# Patient Record
Sex: FEMALE | Race: WHITE | NOT HISPANIC OR LATINO | Employment: STUDENT | URBAN - METROPOLITAN AREA
[De-identification: names, ages, dates, MRNs, and addresses within clinical notes are randomized per-mention and may not be internally consistent; named-entity substitution may affect disease eponyms.]

---
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
## 2021-02-26 DIAGNOSIS — J45.31 MILD PERSISTENT ASTHMA WITH ACUTE EXACERBATION: ICD-10-CM

## 2021-02-26 DIAGNOSIS — J30.1 CHRONIC SEASONAL ALLERGIC RHINITIS DUE TO POLLEN: ICD-10-CM

## 2021-02-28 RX ORDER — MONTELUKAST SODIUM 10 MG/1
TABLET ORAL
Qty: 90 TABLET | Refills: 0 | Status: SHIPPED | OUTPATIENT
Start: 2021-02-28 | End: 2021-06-30

## 2021-06-28 ASSESSMENT — ENCOUNTER SYMPTOMS
DYSURIA: 0
NERVOUS/ANXIOUS: 1
CHILLS: 0
ABDOMINAL PAIN: 0
HEARTBURN: 0
WEAKNESS: 0
HEMATOCHEZIA: 0
NAUSEA: 0
CONSTIPATION: 0
HEMATURIA: 0
SORE THROAT: 0
BREAST MASS: 0
EYE PAIN: 0
PALPITATIONS: 0
MYALGIAS: 0
SHORTNESS OF BREATH: 0
DIZZINESS: 0
JOINT SWELLING: 0
ARTHRALGIAS: 0
FEVER: 0
COUGH: 0
DIARRHEA: 0
FREQUENCY: 0
PARESTHESIAS: 0
HEADACHES: 0

## 2021-06-28 NOTE — PROGRESS NOTES
"    Assessment & Plan     Mild persistent asthma without complication  Well controlled, continue current medications without change  - albuterol (VENTOLIN HFA) 108 (90 Base) MCG/ACT inhaler; INHALE TWO PUFFS BY MOUTH EVERY 4 HOURS AS NEEDED FOR SHORTNESS OF BREATH OR DYSPNEA    Chronic seasonal allergic rhinitis due to pollen  Well controlled, continue current medications  - montelukast (SINGULAIR) 10 MG tablet; Take 1 tablet (10 mg) by mouth At Bedtime    Migraine with aura and without status migrainosus, not intractable  Well controlled, continue current medications  - topiramate (TOPAMAX) 25 MG tablet; Take 1 tablet (25 mg) by mouth 2 times daily  - SUMAtriptan (IMITREX) 50 MG tablet; Take 1 tablet (50 mg) by mouth at onset of headache for migraine May repeat in 2 hours. Try to limit to 3-4 tablets per week.  - ondansetron (ZOFRAN) 4 MG tablet; Take 1 tablet (4 mg) by mouth every 8 hours as needed for nausea    Anxiety  Discussed options for treatment- could start with medications, therapy, or both. Patient opts for medications. If patient chooses to start medications, discussed it takes about 2 weeks for medications to be effective, patients with depression may have increased suicidal ideation at this time and should be alert to this. Reviewed the need for at least 6-12 months of medication once mood is stable, may then consider weaning down after discussing with provider. If patient chooses to start with therapy, recommend at least 3 visits minimum with a therapist, may need to see more than one therapist to find a good fit. Also discussed healthy lifestyle habits including exercise, diet, and regular sleep schedule.    - escitalopram (LEXAPRO) 5 MG tablet; Take 1 tablet (5 mg) by mouth daily    Ordering of each unique test  Prescription drug management         BMI:   Estimated body mass index is 40.32 kg/m  as calculated from the following:    Height as of 7/22/20: 1.765 m (5' 9.5\").    Weight as of 7/22/20: " 125.6 kg (277 lb).       See Patient Instructions    Return in about 1 month (around 7/30/2021) for Anxiety-virtual visit ok.    ANTONIETA Fierro CNP [facility] YONNY Marlow is a 21 year old who presents for the following health issues     Asthma Follow-Up    Was ACT completed today?    Yes    ACT Total Scores 6/30/2020   ACT TOTAL SCORE -   ASTHMA ER VISITS -   ASTHMA HOSPITALIZATIONS -   ACT TOTAL SCORE (Goal Greater than or Equal to 20) 21   In the past 12 months, how many times did you visit the emergency room for your asthma without being admitted to the hospital? 0   In the past 12 months, how many times were you hospitalized overnight because of your asthma? 0          How many days per week do you miss taking your asthma controller medication?  0    Please describe any recent triggers for your asthma: COVID vaccine    Have you had any Emergency Room Visits, Urgent Care Visits, or Hospital Admissions since your last office visit?  No    Migraines are well controlled- occasional breakthrough headache if forgetting the Topamax. Hasn't used Imitrex for some time.    Anxiety the last couple years, panic attack once during basketball. Was vaping to cope, but quit this when she moved home for the summer. No alcohol or other drugs. No depression symptoms. Wonders about medications        Review of Systems   Constitutional: Negative for chills and fever.   HENT: Negative for congestion, ear pain, hearing loss and sore throat.    Eyes: Negative for pain and visual disturbance.   Respiratory: Negative for cough and shortness of breath.    Cardiovascular: Negative for chest pain, palpitations and peripheral edema.   Gastrointestinal: Negative for abdominal pain, constipation, diarrhea, heartburn, hematochezia and nausea.   Breasts:  Negative for tenderness, breast mass and discharge.   Genitourinary: Negative for dysuria, frequency, genital sores, hematuria, pelvic pain,  urgency, vaginal bleeding and vaginal discharge.   Musculoskeletal: Negative for arthralgias, joint swelling and myalgias.   Skin: Negative for rash.   Neurological: Negative for dizziness, weakness, headaches and paresthesias.   Psychiatric/Behavioral: Negative for mood changes. The patient is nervous/anxious.             Objective    There were no vitals taken for this visit.  There is no height or weight on file to calculate BMI.  Physical Exam   GENERAL: healthy, alert and no distress  RESP: lungs clear to auscultation - no rales, rhonchi or wheezes  CV: regular rate and rhythm, normal S1 S2, no S3 or S4, no murmur, click or rub, no peripheral edema and peripheral pulses strong  NEURO: Normal strength and tone, mentation intact and speech normal  PSYCH: mentation appears normal, affect normal/bright    No results found for any visits on 06/30/21.

## 2021-06-30 ENCOUNTER — OFFICE VISIT (OUTPATIENT)
Dept: FAMILY MEDICINE | Facility: CLINIC | Age: 21
End: 2021-06-30
Payer: COMMERCIAL

## 2021-06-30 DIAGNOSIS — J30.1 CHRONIC SEASONAL ALLERGIC RHINITIS DUE TO POLLEN: ICD-10-CM

## 2021-06-30 DIAGNOSIS — G43.109 MIGRAINE WITH AURA AND WITHOUT STATUS MIGRAINOSUS, NOT INTRACTABLE: ICD-10-CM

## 2021-06-30 DIAGNOSIS — F41.9 ANXIETY: ICD-10-CM

## 2021-06-30 DIAGNOSIS — J45.30 MILD PERSISTENT ASTHMA WITHOUT COMPLICATION: Primary | ICD-10-CM

## 2021-06-30 PROCEDURE — 99214 OFFICE O/P EST MOD 30 MIN: CPT | Performed by: NURSE PRACTITIONER

## 2021-06-30 RX ORDER — SUMATRIPTAN 50 MG/1
50 TABLET, FILM COATED ORAL
Qty: 18 TABLET | Refills: 3 | Status: SHIPPED | OUTPATIENT
Start: 2021-06-30

## 2021-06-30 RX ORDER — MONTELUKAST SODIUM 10 MG/1
1 TABLET ORAL AT BEDTIME
Qty: 90 TABLET | Refills: 3 | Status: SHIPPED | OUTPATIENT
Start: 2021-06-30 | End: 2024-01-15

## 2021-06-30 RX ORDER — ALBUTEROL SULFATE 0.83 MG/ML
2.5 SOLUTION RESPIRATORY (INHALATION) EVERY 6 HOURS PRN
Qty: 60 ML | Refills: 1 | Status: SHIPPED | OUTPATIENT
Start: 2021-06-30

## 2021-06-30 RX ORDER — ESCITALOPRAM OXALATE 5 MG/1
5 TABLET ORAL DAILY
Qty: 30 TABLET | Refills: 1 | Status: SHIPPED | OUTPATIENT
Start: 2021-06-30 | End: 2024-01-15

## 2021-06-30 RX ORDER — ALBUTEROL SULFATE 90 UG/1
AEROSOL, METERED RESPIRATORY (INHALATION)
Qty: 18 G | Refills: 3 | Status: SHIPPED | OUTPATIENT
Start: 2021-06-30 | End: 2024-01-15

## 2021-06-30 RX ORDER — FLUTICASONE PROPIONATE 44 UG/1
2 AEROSOL, METERED RESPIRATORY (INHALATION) 2 TIMES DAILY
Qty: 10.6 G | Refills: 5 | Status: SHIPPED | OUTPATIENT
Start: 2021-06-30 | End: 2024-01-15

## 2021-06-30 RX ORDER — ONDANSETRON 4 MG/1
4 TABLET, FILM COATED ORAL EVERY 8 HOURS PRN
Qty: 20 TABLET | Refills: 0 | Status: SHIPPED | OUTPATIENT
Start: 2021-06-30

## 2021-06-30 RX ORDER — TOPIRAMATE 25 MG/1
25 TABLET, FILM COATED ORAL 2 TIMES DAILY
Qty: 180 TABLET | Refills: 3 | Status: SHIPPED | OUTPATIENT
Start: 2021-06-30 | End: 2024-01-15

## 2021-06-30 NOTE — LETTER
My Asthma Action Plan    Name: Kashmir Vazquez   YOB: 2000  Date: 7/1/2021   My doctor: ANTONIETA Fierro CNP   My clinic: [facility]        My Control Medicine: Fluticasone propionate (Flovent HFA) - 44 mcg 2 puffs twice daily  Montelukast (Singulair) -  10 mg daily  My Rescue Medicine: Albuterol (Proair/Ventolin/Proventil HFA) 2-4 puffs EVERY 4 HOURS as needed. Use a spacer if recommended by your provider.   My Asthma Severity:   Mild Persistent  Know your asthma triggers:   COVID vaccine            GREEN ZONE   Good Control    I feel good    No cough or wheeze    Can work, sleep and play without asthma symptoms       Take your asthma control medicine every day.     1. If exercise triggers your asthma, take your rescue medication    15 minutes before exercise or sports, and    During exercise if you have asthma symptoms  2. Spacer to use with inhaler: If you have a spacer, make sure to use it with your inhaler             YELLOW ZONE Getting Worse  I have ANY of these:    I do not feel good    Cough or wheeze    Chest feels tight    Wake up at night   1. Keep taking your Green Zone medications  2. Start taking your rescue medicine:    every 20 minutes for up to 1 hour. Then every 4 hours for 24-48 hours.  3. If you stay in the Yellow Zone for more than 12-24 hours, contact your doctor.  4. If you do not return to the Green Zone in 12-24 hours or you get worse, start taking your oral steroid medicine if prescribed by your provider.           RED ZONE Medical Alert - Get Help  I have ANY of these:    I feel awful    Medicine is not helping    Breathing getting harder    Trouble walking or talking    Nose opens wide to breathe       1. Take your rescue medicine NOW  2. If your provider has prescribed an oral steroid medicine, start taking it NOW  3. Call your doctor NOW  4. If you are still in the Red Zone after 20 minutes and you have not reached your doctor:    Take  your rescue medicine again and    Call 911 or go to the emergency room right away    See your regular doctor within 2 weeks of an Emergency Room or Urgent Care visit for follow-up treatment.          Annual Reminders:  Meet with Asthma Educator,  Flu Shot in the Fall, consider Pneumonia Vaccination for patients with asthma (aged 19 and older).    Pharmacy:    [facility]  [facility] - [place], [place] - [address]  [facility] - [place], [place] - [address] [place] [address]    Electronically signed by ANTONIETA Fierro CNP   Date: 07/01/21                      Asthma Triggers  How To Control Things That Make Your Asthma Worse    Triggers are things that make your asthma worse.  Look at the list below to help you find your triggers and what you can do about them.  You can help prevent asthma flare-ups by staying away from your triggers.      Trigger                                                          What you can do   Cigarette Smoke  Tobacco smoke can make asthma worse. Do not allow smoking in your home, car or around you.  Be sure no one smokes at a child s day care or school.  If you smoke, ask your health care provider for ways to help you quit.  Ask family members to quit too.  Ask your health care provider for a referral to Quit Plan to help you quit smoking, or call 6-750-168-PLAN.     Colds, Flu, Bronchitis  These are common triggers of asthma. Wash your hands often.  Don t touch your eyes, nose or mouth.  Get a flu shot every year.     Dust Mites  These are tiny bugs that live in cloth or carpet. They are too small to see. Wash sheets and blankets in hot water every week.   Encase pillows and mattress in dust mite proof covers.  Avoid having carpet if you can. If you have carpet, vacuum weekly.   Use a dust mask and HEPA vacuum.   Pollen and Outdoor Mold  Some people are allergic to trees, grass,  or weed pollen, or molds. Try to keep your windows closed.  Limit time out doors when pollen count is high.   Ask you health care provider about taking medicine during allergy season.     Animal Dander  Some people are allergic to skin flakes, urine or saliva from pets with fur or feathers. Keep pets with fur or feathers out of your home.    If you can t keep the pet outdoors, then keep the pet out of your bedroom.  Keep the bedroom door closed.  Keep pets off cloth furniture and away from stuffed toys.     Mice, Rats, and Cockroaches   Some people are allergic to the waste from these pests.   Cover food and garbage.  Clean up spills and food crumbs.  Store grease in the refrigerator.   Keep food out of the bedroom.   Indoor Mold  This can be a trigger if your home has high moisture. Fix leaking faucets, pipes, or other sources of water.   Clean moldy surfaces.  Dehumidify basement if it is damp and smelly.   Smoke, Strong Odors, and Sprays  These can reduce air quality. Stay away from strong odors and sprays, such as perfume, powder, hair spray, paints, smoke incense, paint, cleaning products, candles and new carpet.   Exercise or Sports  Some people with asthma have this trigger. Be active!  Ask your doctor about taking medicine before sports or exercise to prevent symptoms.    Warm up for 5-10 minutes before and after sports or exercise.     Other Triggers of Asthma  Cold air:  Cover your nose and mouth with a scarf.  Sometimes laughing or crying can be a trigger.  Some medicines and food can trigger asthma.

## 2021-07-01 ASSESSMENT — ASTHMA QUESTIONNAIRES: ACT_TOTALSCORE: 23

## 2021-10-02 ENCOUNTER — HEALTH MAINTENANCE LETTER (OUTPATIENT)
Age: 21
End: 2021-10-02

## 2022-02-14 ENCOUNTER — OFFICE VISIT (OUTPATIENT)
Dept: OBGYN | Facility: CLINIC | Age: 22
End: 2022-02-14
Payer: COMMERCIAL

## 2022-02-14 VITALS
DIASTOLIC BLOOD PRESSURE: 84 MMHG | BODY MASS INDEX: 34.76 KG/M2 | HEART RATE: 71 BPM | SYSTOLIC BLOOD PRESSURE: 134 MMHG | WEIGHT: 238.8 LBS

## 2022-02-14 DIAGNOSIS — Z23 NEED FOR PROPHYLACTIC VACCINATION AND INOCULATION AGAINST INFLUENZA: ICD-10-CM

## 2022-02-14 DIAGNOSIS — N93.9 ABNORMAL UTERINE BLEEDING (AUB): Primary | ICD-10-CM

## 2022-02-14 PROCEDURE — 90471 IMMUNIZATION ADMIN: CPT | Performed by: OBSTETRICS & GYNECOLOGY

## 2022-02-14 PROCEDURE — 99213 OFFICE O/P EST LOW 20 MIN: CPT | Mod: 25 | Performed by: OBSTETRICS & GYNECOLOGY

## 2022-02-14 PROCEDURE — 11983 REMOVE/INSERT DRUG IMPLANT: CPT | Performed by: OBSTETRICS & GYNECOLOGY

## 2022-02-14 PROCEDURE — 90686 IIV4 VACC NO PRSV 0.5 ML IM: CPT | Performed by: OBSTETRICS & GYNECOLOGY

## 2022-02-14 RX ORDER — NORGESTIMATE AND ETHINYL ESTRADIOL 0.25-0.035
1 KIT ORAL DAILY
Qty: 84 TABLET | Refills: 0 | Status: SHIPPED | OUTPATIENT
Start: 2022-02-14 | End: 2024-01-15

## 2022-02-14 NOTE — PROGRESS NOTES
GYN Progress Note     CC: Abnormal uterine bleeding     HISTORY OF PRESENT ILLNESS:  Kashmir Vazquez is a 22 year old  who presents for follow up for abnormal uterine bleeding. She was previously seen in clinic for dysmenorrhea and was started on OCPs for menstrual suppression, she did have some concerns about changes in mood as well as worsening headaches with OCPs so a Nexplanon implant was placed on  and has worked very well to suppress her periods until the last few months. She would typically only get an occasional spotting lasting 1-2 days but over the past few months this has become a lot more frequent and she is wondering if replacing the Nexplanon will improve her bleeding profile.     She is not currently sexually active and has not been in the past, not concerned about need for contraception at this point in time. She has never had a pap smear or pelvic exam. We discussed the role of pap smears in cervical cancer screening and that almost all cervical cancers are caused by HPV which is a sexually transmitted virus but can also be transmitted by genital contact (not just penetrative intercourse) as well as the ASCCP recommendations to start pap smear screening at age 21, she declines at this time.        OB HISTORY:  OB History    Para Term  AB Living   0 0 0 0 0 0   SAB IAB Ectopic Multiple Live Births   0 0 0 0 0            GYN HISTORY:  She has never had a pap smear or pelvic exam  HPV vaccine series completed      PAST MEDICAL HISTORY:  Past Medical History:   Diagnosis Date     Asthma             PAST SURGICAL HISTORY:  Past Surgical History:   Procedure Laterality Date     NO HISTORY OF SURGERY       ORTHOPEDIC SURGERY            CURRENT MEDICATIONS:   Current Outpatient Medications   Medication Sig Dispense Refill     etonogestrel (NEXPLANON) 68 MG IMPL 1 each (68 mg) by Subdermal route once       norgestimate-ethinyl estradiol (ORTHO-CYCLEN) 0.25-35 MG-MCG tablet Take 1  tablet by mouth daily 84 tablet 0     albuterol (PROVENTIL) (2.5 MG/3ML) 0.083% neb solution Take 1 vial (2.5 mg) by nebulization every 6 hours as needed for shortness of breath / dyspnea or wheezing 60 mL 1     albuterol (VENTOLIN HFA) 108 (90 Base) MCG/ACT inhaler INHALE TWO PUFFS BY MOUTH EVERY 4 HOURS AS NEEDED FOR SHORTNESS OF BREATH OR DYSPNEA 18 g 3     escitalopram (LEXAPRO) 5 MG tablet Take 1 tablet (5 mg) by mouth daily 30 tablet 1     fexofenadine (ALLEGRA) 180 MG tablet Take 180 mg by mouth daily       fluticasone (FLOVENT HFA) 44 MCG/ACT inhaler Inhale 2 puffs into the lungs 2 times daily 10.6 g 5     montelukast (SINGULAIR) 10 MG tablet Take 1 tablet (10 mg) by mouth At Bedtime 90 tablet 3     ondansetron (ZOFRAN) 4 MG tablet Take 1 tablet (4 mg) by mouth every 8 hours as needed for nausea 20 tablet 0     SUMAtriptan (IMITREX) 50 MG tablet Take 1 tablet (50 mg) by mouth at onset of headache for migraine May repeat in 2 hours. Try to limit to 3-4 tablets per week. 18 tablet 3     topiramate (TOPAMAX) 25 MG tablet Take 1 tablet (25 mg) by mouth 2 times daily 180 tablet 3            ALLERGIES:  Other [no clinical screening - see comments]         SOCIAL HISTORY:  Social History     Tobacco Use     Smoking status: Never Smoker     Smokeless tobacco: Never Used   Substance Use Topics     Alcohol use: No     Drug use: No            FAMILY HISTORY:  Family History   Problem Relation Age of Onset     Migraines Mother      Asthma Mother      Diabetes Maternal Grandmother      Colon Cancer Maternal Grandmother      Family History Negative No family hx of             REVIEW OF SYSTEMS:  See HPI        PHYSICAL EXAMINATION:  VS:/84   Pulse 71   Wt 108.3 kg (238 lb 12.8 oz)   BMI 34.76 kg/m    Body mass index is 34.76 kg/m .    General: Patient alert and oriented, no acute distress  CV: no peripheral edema or cyanosis  Resp: normal respiratory effort and equal lung expansion  : exam deferred   Ext:  non-tender, no edema    Nexplanon removal/replacement procedure     Kashmir Vazquez desired a nexplanon removal and reinsertion. We discussed the risks/benefits of the procedure, including small scar on the arm, mild pain/bruising after the procedure, irregular bleeding/spotting, and that the device is effective FDA approved for 3 years. Consent was signed and a time out performed. The device was located and the overlying skin was prepped with Betadine.  2 cc of 1% Lidocaine was injected at the site of incision and a scalpel was used to open the skin.  The Etonorgestrel device was located and brought to the opening made in the skin.  It was then grasped with a hemostat and the capsule was removed until the device could be easily removed.  Due to the change in the recommended location of device insertion since her prior Nexplanon was placed, we were unable to use the same incision to place the new device so skin glue was placed over the incision.      We then cleaned the skin of the left upper arm 3 finger widths from the olecranon process with betadine just below the groove between the biceps and triceps muscle. We injected 3 mL of 1% lidocaine into the arm and then inserted the nexplanon device subdermally.  Both the examiner and the patient felt the cassidy after placement. The site was dressed with a sterile dressing. The patient tolerated the procedure well. EBL minimal.    Laboratory values:  Imaging findings:        ASSESSMENT:  Kashmir Vazquez is a 22 year old  who presents for evaluation of abnormal uterine bleeding     PLAN:  -Dysmenorrhea has been well controlled for the past 3 years with the Nexplanon but over the past few months, Kashmir has started to get more irregular spotting. Her prior Nexplanon was removed and replaced with a new device without difficulty.   -She also requested a prescription for OCPs as she is heading back to college this week and is concerned about dealing with the  inconvenience of dealing with irregular bleeding at an inopportune time, we discussed that usually the irregular bleeding with the Nexplanon improves within 3-6 months but that she could do a short course of OCPs if needed to help manage the irregular bleeding if necessary.   -She declined a pelvic exam today but we would recommend that she have an exam done if the irregular bleeding doesn't resolve to rule out other etiologies. She was also encouraged to have pap smear screening completed as soon as she feels comfortable proceeding.     Dispo: RTC as needed   Elsy Gonzalez MD

## 2022-03-28 NOTE — PROGRESS NOTES
VISION   Wears glasses and contact lenses: worn for testing  Tool used: [place]   Right eye:        10/8 (20/16)  Left eye:          10/8 (20/16)  Visual Acuity: Pass    Tanna Henry MA       No

## 2022-09-03 ENCOUNTER — HEALTH MAINTENANCE LETTER (OUTPATIENT)
Age: 22
End: 2022-09-03

## 2023-01-15 ENCOUNTER — HEALTH MAINTENANCE LETTER (OUTPATIENT)
Age: 23
End: 2023-01-15

## 2023-10-29 ENCOUNTER — TRANSFERRED RECORDS (OUTPATIENT)
Dept: HEALTH INFORMATION MANAGEMENT | Facility: CLINIC | Age: 23
End: 2023-10-29
Payer: COMMERCIAL

## 2023-11-09 ENCOUNTER — TRANSFERRED RECORDS (OUTPATIENT)
Dept: HEALTH INFORMATION MANAGEMENT | Facility: CLINIC | Age: 23
End: 2023-11-09
Payer: COMMERCIAL

## 2024-01-14 ASSESSMENT — ASTHMA QUESTIONNAIRES
QUESTION_1 LAST FOUR WEEKS HOW MUCH OF THE TIME DID YOUR ASTHMA KEEP YOU FROM GETTING AS MUCH DONE AT WORK, SCHOOL OR AT HOME: NONE OF THE TIME
QUESTION_4 LAST FOUR WEEKS HOW OFTEN HAVE YOU USED YOUR RESCUE INHALER OR NEBULIZER MEDICATION (SUCH AS ALBUTEROL): ONCE A WEEK OR LESS
QUESTION_2 LAST FOUR WEEKS HOW OFTEN HAVE YOU HAD SHORTNESS OF BREATH: ONCE OR TWICE A WEEK
ACT_TOTALSCORE: 22
ACT_TOTALSCORE: 22
QUESTION_5 LAST FOUR WEEKS HOW WOULD YOU RATE YOUR ASTHMA CONTROL: WELL CONTROLLED
QUESTION_3 LAST FOUR WEEKS HOW OFTEN DID YOUR ASTHMA SYMPTOMS (WHEEZING, COUGHING, SHORTNESS OF BREATH, CHEST TIGHTNESS OR PAIN) WAKE YOU UP AT NIGHT OR EARLIER THAN USUAL IN THE MORNING: NOT AT ALL

## 2024-01-15 ENCOUNTER — OFFICE VISIT (OUTPATIENT)
Dept: FAMILY MEDICINE | Facility: CLINIC | Age: 24
End: 2024-01-15
Payer: COMMERCIAL

## 2024-01-15 VITALS
BODY MASS INDEX: 35.81 KG/M2 | HEIGHT: 72 IN | OXYGEN SATURATION: 97 % | HEART RATE: 81 BPM | SYSTOLIC BLOOD PRESSURE: 123 MMHG | DIASTOLIC BLOOD PRESSURE: 86 MMHG | WEIGHT: 264.4 LBS | TEMPERATURE: 98.3 F

## 2024-01-15 DIAGNOSIS — F41.0 PANIC ATTACK: ICD-10-CM

## 2024-01-15 DIAGNOSIS — G43.109 MIGRAINE WITH AURA AND WITHOUT STATUS MIGRAINOSUS, NOT INTRACTABLE: ICD-10-CM

## 2024-01-15 DIAGNOSIS — Z11.59 NEED FOR HEPATITIS C SCREENING TEST: ICD-10-CM

## 2024-01-15 DIAGNOSIS — Z12.4 CERVICAL CANCER SCREENING: ICD-10-CM

## 2024-01-15 DIAGNOSIS — J45.30 MILD PERSISTENT ASTHMA WITHOUT COMPLICATION: ICD-10-CM

## 2024-01-15 DIAGNOSIS — Z11.4 SCREENING FOR HIV (HUMAN IMMUNODEFICIENCY VIRUS): ICD-10-CM

## 2024-01-15 DIAGNOSIS — Z00.00 ROUTINE GENERAL MEDICAL EXAMINATION AT A HEALTH CARE FACILITY: Primary | ICD-10-CM

## 2024-01-15 DIAGNOSIS — R55 VASOVAGAL SYNCOPE: ICD-10-CM

## 2024-01-15 DIAGNOSIS — J30.1 CHRONIC SEASONAL ALLERGIC RHINITIS DUE TO POLLEN: ICD-10-CM

## 2024-01-15 DIAGNOSIS — Z97.5 NEXPLANON IN PLACE: ICD-10-CM

## 2024-01-15 LAB
ALBUMIN SERPL BCG-MCNC: 4.6 G/DL (ref 3.5–5.2)
ALP SERPL-CCNC: 74 U/L (ref 40–150)
ALT SERPL W P-5'-P-CCNC: 9 U/L (ref 0–50)
ANION GAP SERPL CALCULATED.3IONS-SCNC: 11 MMOL/L (ref 7–15)
AST SERPL W P-5'-P-CCNC: 24 U/L (ref 0–45)
BILIRUB SERPL-MCNC: 0.5 MG/DL
BUN SERPL-MCNC: 8.3 MG/DL (ref 6–20)
CALCIUM SERPL-MCNC: 9.6 MG/DL (ref 8.6–10)
CHLORIDE SERPL-SCNC: 106 MMOL/L (ref 98–107)
CREAT SERPL-MCNC: 0.81 MG/DL (ref 0.51–0.95)
DEPRECATED HCO3 PLAS-SCNC: 22 MMOL/L (ref 22–29)
EGFRCR SERPLBLD CKD-EPI 2021: >90 ML/MIN/1.73M2
ERYTHROCYTE [DISTWIDTH] IN BLOOD BY AUTOMATED COUNT: 12.3 % (ref 10–15)
GLUCOSE SERPL-MCNC: 86 MG/DL (ref 70–99)
HCT VFR BLD AUTO: 39.8 % (ref 35–47)
HCV AB SERPL QL IA: NONREACTIVE
HGB BLD-MCNC: 13.3 G/DL (ref 11.7–15.7)
HIV 1+2 AB+HIV1 P24 AG SERPL QL IA: NONREACTIVE
MCH RBC QN AUTO: 31.5 PG (ref 26.5–33)
MCHC RBC AUTO-ENTMCNC: 33.4 G/DL (ref 31.5–36.5)
MCV RBC AUTO: 94 FL (ref 78–100)
PLATELET # BLD AUTO: 285 10E3/UL (ref 150–450)
POTASSIUM SERPL-SCNC: 4 MMOL/L (ref 3.4–5.3)
PROT SERPL-MCNC: 7.7 G/DL (ref 6.4–8.3)
RBC # BLD AUTO: 4.22 10E6/UL (ref 3.8–5.2)
SODIUM SERPL-SCNC: 139 MMOL/L (ref 135–145)
T4 FREE SERPL-MCNC: 1.22 NG/DL (ref 0.9–1.7)
TSH SERPL DL<=0.005 MIU/L-ACNC: 2.5 UIU/ML (ref 0.3–4.2)
WBC # BLD AUTO: 5.7 10E3/UL (ref 4–11)

## 2024-01-15 PROCEDURE — 90686 IIV4 VACC NO PRSV 0.5 ML IM: CPT | Performed by: PHYSICIAN ASSISTANT

## 2024-01-15 PROCEDURE — 93000 ELECTROCARDIOGRAM COMPLETE: CPT | Performed by: PHYSICIAN ASSISTANT

## 2024-01-15 PROCEDURE — 90471 IMMUNIZATION ADMIN: CPT | Performed by: PHYSICIAN ASSISTANT

## 2024-01-15 PROCEDURE — 80053 COMPREHEN METABOLIC PANEL: CPT | Performed by: PHYSICIAN ASSISTANT

## 2024-01-15 PROCEDURE — 36415 COLL VENOUS BLD VENIPUNCTURE: CPT | Performed by: PHYSICIAN ASSISTANT

## 2024-01-15 PROCEDURE — 85027 COMPLETE CBC AUTOMATED: CPT | Performed by: PHYSICIAN ASSISTANT

## 2024-01-15 PROCEDURE — 87389 HIV-1 AG W/HIV-1&-2 AB AG IA: CPT | Performed by: PHYSICIAN ASSISTANT

## 2024-01-15 PROCEDURE — 99214 OFFICE O/P EST MOD 30 MIN: CPT | Mod: 25 | Performed by: PHYSICIAN ASSISTANT

## 2024-01-15 PROCEDURE — 86803 HEPATITIS C AB TEST: CPT | Performed by: PHYSICIAN ASSISTANT

## 2024-01-15 PROCEDURE — 82306 VITAMIN D 25 HYDROXY: CPT | Performed by: PHYSICIAN ASSISTANT

## 2024-01-15 PROCEDURE — 84439 ASSAY OF FREE THYROXINE: CPT | Performed by: PHYSICIAN ASSISTANT

## 2024-01-15 PROCEDURE — 84443 ASSAY THYROID STIM HORMONE: CPT | Performed by: PHYSICIAN ASSISTANT

## 2024-01-15 RX ORDER — MONTELUKAST SODIUM 10 MG/1
1 TABLET ORAL AT BEDTIME
Qty: 90 TABLET | Refills: 3 | Status: SHIPPED | OUTPATIENT
Start: 2024-01-15

## 2024-01-15 RX ORDER — HYDROXYZINE HYDROCHLORIDE 25 MG/1
25 TABLET, FILM COATED ORAL 3 TIMES DAILY PRN
Qty: 90 TABLET | Refills: 0 | Status: SHIPPED | OUTPATIENT
Start: 2024-01-15

## 2024-01-15 RX ORDER — ALBUTEROL SULFATE 90 UG/1
AEROSOL, METERED RESPIRATORY (INHALATION)
Qty: 18 G | Refills: 3 | Status: SHIPPED | OUTPATIENT
Start: 2024-01-15

## 2024-01-15 RX ORDER — TOPIRAMATE 25 MG/1
25 TABLET, FILM COATED ORAL 2 TIMES DAILY
Qty: 180 TABLET | Refills: 3 | Status: SHIPPED | OUTPATIENT
Start: 2024-01-15

## 2024-01-15 ASSESSMENT — ENCOUNTER SYMPTOMS: SYNCOPE: 1

## 2024-01-15 NOTE — PROGRESS NOTES
SUBJECTIVE:   Kashmir is a 23 year old, presenting for the following:  Syncope        1/15/2024     7:52 AM   Additional Questions   Roomed by An V.         1/15/2024     7:52 AM   Patient Reported Additional Medications   Patient reports taking the following new medications none       Syncope     History of Present Illness       Reason for visit:  Fainting/passing out  Symptom onset:  3-4 weeks ago  Symptoms include:  Light headed cold/clammy, pass out  Symptom intensity:  Moderate  Symptom progression:  Staying the same  Had these symptoms before:  No  What makes it better:  Drinking electrolytes with salt    She eats 2-3 servings of fruits and vegetables daily.She consumes 2 sweetened beverage(s) daily.She exercises with enough effort to increase her heart rate 10 to 19 minutes per day.  She exercises with enough effort to increase her heart rate 3 or less days per week.   She is taking medications regularly.      Today's PHQ-2 Score:       1/14/2024     8:59 AM   PHQ-2 ( 1999 [employer])   Q1: Little interest or pleasure in doing things 0   Q2: Feeling down, depressed or hopeless 0   PHQ-2 Score 0   Q1: Little interest or pleasure in doing things Not at all   Q2: Feeling down, depressed or hopeless Not at all   PHQ-2 Score 0       June 2023 - was at a concert, it was hot. She donated plasma earlier that morning. Not drinking much water.    Dec 27 2023 - was at a nail salon, got hot/lightheaded/ringing in ears. Eyes went black. Woke up on ground. Felt fine the rest of the day. Felt fine otherwise, no excessive alcohol.     Mom says she is always tired. Has had occasional nausea.     No family history of heart issues.     Graduated last year at [employer]. Working at [employer].    Migraines - was taking this daily, would like to get back on it. Still getting migraines, tries to sleep them off.             Have you ever done Advance Care Planning? (For example, a Health Directive, POLST, or a discussion with a medical  "provider or your loved ones about your wishes): No, advance care planning information given to patient to review.  Patient plans to discuss their wishes with loved ones or provider.      Social History     Tobacco Use    Smoking status: Never    Smokeless tobacco: Never   Substance Use Topics    Alcohol use: No             6/24/2021     7:35 AM   Alcohol Use   Prescreen: >3 drinks/day or >7 drinks/week? Not Applicable     Reviewed orders with patient.  Reviewed health maintenance and updated orders accordingly - Yes      Breast Cancer Screening:        History of abnormal Pap smear: NO - age 21-29 PAP every 3 years recommended     Reviewed and updated as needed this visit by clinical staff   Tobacco  Allergies  Meds              Reviewed and updated as needed this visit by Provider                     Review of Systems   Cardiovascular:  Positive for syncope.     CONSTITUTIONAL: NEGATIVE for fever, chills, change in weight  INTEGUMENTARU/SKIN: NEGATIVE for worrisome rashes, moles or lesions  EYES: NEGATIVE for vision changes or irritation  ENT: NEGATIVE for ear, mouth and throat problems  RESP: NEGATIVE for significant cough or SOB  BREAST: NEGATIVE for masses, tenderness or discharge  CV: NEGATIVE for chest pain, palpitations or peripheral edema  GI: NEGATIVE for nausea, abdominal pain, heartburn, or change in bowel habits  : NEGATIVE for unusual urinary or vaginal symptoms. Periods are regular.  MUSCULOSKELETAL: NEGATIVE for significant arthralgias or myalgia  NEURO: NEGATIVE for weakness, dizziness or paresthesias  PSYCHIATRIC: NEGATIVE for changes in mood or affect     OBJECTIVE:   /86   Pulse 81   Temp 98.3  F (36.8  C) (Oral)   Ht 1.824 m (5' 11.81\")   Wt 119.9 kg (264 lb 6.4 oz)   SpO2 97%   BMI 36.05 kg/m    Physical Exam  GENERAL: healthy, alert and no distress  EYES: Eyes grossly normal to inspection, PERRL and conjunctivae and sclerae normal  HENT: ear canals and TM's normal, nose and " mouth without ulcers or lesions  NECK: no adenopathy, no asymmetry, masses, or scars and thyroid normal to palpation  RESP: lungs clear to auscultation - no rales, rhonchi or wheezes  CV: regular rate and rhythm, normal S1 S2, no S3 or S4, no murmur, click or rub, no peripheral edema and peripheral pulses strong  ABDOMEN: soft, nontender, no hepatosplenomegaly, no masses and bowel sounds normal  MS: no gross musculoskeletal defects noted, no edema  SKIN: no suspicious lesions or rashes  NEURO: Normal strength and tone, mentation intact and speech normal  PSYCH: mentation appears normal, affect normal/bright    Diagnostic Test Results:  Labs reviewed in Epic  EKG - negative    ASSESSMENT/PLAN:   1. Routine general medical examination at a health care facility  Well adult. Due for pap - has upcoming ob/gyn appt and plans to have it done there.    2. Vasovagal syncope  EKG normal. Discussed possible etiologies. Will obtain labs - encouraged healthy diet, increased water intake, etc.   - Comprehensive metabolic panel (BMP + Alb, Alk Phos, ALT, AST, Total. Bili, TP); Future  - CBC with platelets; Future  - TSH; Future  - T4, free; Future  - EKG 12-lead complete w/read - Clinics  - Vitamin D Deficiency; Future  - Comprehensive metabolic panel (BMP + Alb, Alk Phos, ALT, AST, Total. Bili, TP)  - CBC with platelets  - TSH  - T4, free  - Vitamin D Deficiency    3. Panic attack  Was previously prescribed lexapro - stopped this. Prefers to be on something on an as needed basis rather than daily. Discussed options. Will try hydroxyzine, return if not improving. I discussed with the patient risks and benefits of the new medication prescribed including potential side effects.  The patient had opportunity to ask questions and is comfortable with and interested in medications as prescribed.   - hydrOXYzine HCl (ATARAX) 25 MG tablet; Take 1 tablet (25 mg) by mouth 3 times daily as needed for anxiety  Dispense: 90 tablet; Refill:  "0    4. Migraine with aura and without status migrainosus, not intractable  Will restart topamax for migraines.   - topiramate (TOPAMAX) 25 MG tablet; Take 1 tablet (25 mg) by mouth 2 times daily  Dispense: 180 tablet; Refill: 3    5. Chronic seasonal allergic rhinitis due to pollen  Refilled.   - montelukast (SINGULAIR) 10 MG tablet; Take 1 tablet (10 mg) by mouth at bedtime  Dispense: 90 tablet; Refill: 3    6. Mild persistent asthma without complication  Refilled.   - albuterol (VENTOLIN HFA) 108 (90 Base) MCG/ACT inhaler; INHALE TWO PUFFS BY MOUTH EVERY 4 HOURS AS NEEDED FOR SHORTNESS OF BREATH OR DYSPNEA  Dispense: 18 g; Refill: 3    7. Screening for HIV (human immunodeficiency virus)  Screen  - HIV Antigen Antibody Combo; Future  - HIV Antigen Antibody Combo    8. Need for hepatitis C screening test  Screen.  - Hepatitis C Screen Reflex to HCV RNA Quant and Genotype; Future  - Hepatitis C Screen Reflex to HCV RNA Quant and Genotype    9. Cervical cancer screening  Plans to do with Ob/Gyn    10. Nexplanon in place  Placed last year. Having some spotting.           COUNSELING:  Reviewed preventive health counseling, as reflected in patient instructions      BMI:   Estimated body mass index is 36.05 kg/m  as calculated from the following:    Height as of this encounter: 1.824 m (5' 11.81\").    Weight as of this encounter: 119.9 kg (264 lb 6.4 oz).   Weight management plan: Discussed healthy diet and exercise guidelines      She reports that she has never smoked. She has never used smokeless tobacco.          Mayda Farnsworth PA-C  [facility]  "

## 2024-01-17 LAB — VIT D+METAB SERPL-MCNC: 26 NG/ML (ref 20–50)

## 2024-02-15 ENCOUNTER — OFFICE VISIT (OUTPATIENT)
Dept: OBGYN | Facility: CLINIC | Age: 24
End: 2024-02-15
Payer: COMMERCIAL

## 2024-02-15 VITALS
WEIGHT: 267 LBS | SYSTOLIC BLOOD PRESSURE: 145 MMHG | TEMPERATURE: 97.8 F | HEART RATE: 102 BPM | DIASTOLIC BLOOD PRESSURE: 92 MMHG | BODY MASS INDEX: 36.4 KG/M2

## 2024-02-15 DIAGNOSIS — Z97.5 BREAKTHROUGH BLEEDING ON NEXPLANON: Primary | ICD-10-CM

## 2024-02-15 DIAGNOSIS — N92.1 BREAKTHROUGH BLEEDING ON NEXPLANON: Primary | ICD-10-CM

## 2024-02-15 DIAGNOSIS — Z11.3 SCREEN FOR STD (SEXUALLY TRANSMITTED DISEASE): ICD-10-CM

## 2024-02-15 DIAGNOSIS — Z12.4 SCREENING FOR MALIGNANT NEOPLASM OF CERVIX: ICD-10-CM

## 2024-02-15 PROCEDURE — G0145 SCR C/V CYTO,THINLAYER,RESCR: HCPCS | Performed by: OBSTETRICS & GYNECOLOGY

## 2024-02-15 PROCEDURE — 99213 OFFICE O/P EST LOW 20 MIN: CPT | Performed by: OBSTETRICS & GYNECOLOGY

## 2024-02-15 PROCEDURE — 87491 CHLMYD TRACH DNA AMP PROBE: CPT | Performed by: OBSTETRICS & GYNECOLOGY

## 2024-02-15 PROCEDURE — 87591 N.GONORRHOEAE DNA AMP PROB: CPT | Performed by: OBSTETRICS & GYNECOLOGY

## 2024-02-15 RX ORDER — NORGESTIMATE AND ETHINYL ESTRADIOL 0.25-0.035
1 KIT ORAL DAILY
Qty: 84 TABLET | Refills: 3 | Status: SHIPPED | OUTPATIENT
Start: 2024-02-15 | End: 2024-08-28

## 2024-02-15 NOTE — PROGRESS NOTES
Assessment & Plan     Screen for STD (sexually transmitted disease)  - NEISSERIA GONORRHOEA PCR  - CHLAMYDIA TRACHOMATIS PCR    Screening for malignant neoplasm of cervix    - Pap imaged thin layer screen only - recommended age 21 - 24 years    Breakthrough bleeding on Nexplanon  Can do a pack of OCPs when needed.   Palpable at insertion site  - norgestimate-ethinyl estradiol (ORTHO-CYCLEN) 0.25-35 MG-MCG tablet; Take 1 tablet by mouth daily    Ordering of each unique test  Prescription drug management              No follow-ups on file.    Deidre Marlow is a 24 year old, presenting for the following health issues:  Gyn Exam (Pap, nexplanon shifted )    HPI   Presents for pap, GC/CT  PCP felt nexplanon may have migrated to axilla    Working on finding a job, trying to find in . Worked for [employer] in [place].     Past Medical History:   Diagnosis Date    Asthma        Past Surgical History:   Procedure Laterality Date    NO HISTORY OF SURGERY      ORTHOPEDIC SURGERY         Family History   Problem Relation Age of Onset    Migraines Mother     Asthma Mother     Diabetes Maternal Grandmother     Colon Cancer Maternal Grandmother     Family History Negative No family hx of        Social History     Socioeconomic History    Marital status: Single     Spouse name: Not on file    Number of children: Not on file    Years of education: Not on file    Highest education level: Not on file   Occupational History    Not on file   Tobacco Use    Smoking status: Never    Smokeless tobacco: Never   Substance and Sexual Activity    Alcohol use: No    Drug use: No    Sexual activity: Never   Other Topics Concern    Parent/sibling w/ CABG, MI or angioplasty before 65F 55M? No   Social History Narrative    Not on file     Social Determinants of Health     Financial Resource Strain: Low Risk  (1/14/2024)    Financial Resource Strain     Within the past 12 months, have you or your family members you live with been  unable to get utilities (heat, electricity) when it was really needed?: No   Food Insecurity: Low Risk  (1/14/2024)    Food Insecurity     Within the past 12 months, did you worry that your food would run out before you got money to buy more?: No     Within the past 12 months, did the food you bought just not last and you didn t have money to get more?: No   Transportation Needs: Low Risk  (1/14/2024)    Transportation Needs     Within the past 12 months, has lack of transportation kept you from medical appointments, getting your medicines, non-medical meetings or appointments, work, or from getting things that you need?: No   Physical Activity: Not on file   Stress: Not on file   Social Connections: Not on file   Interpersonal Safety: Low Risk  (1/15/2024)    Interpersonal Safety     Do you feel physically and emotionally safe where you currently live?: Yes     Within the past 12 months, have you been hit, slapped, kicked or otherwise physically hurt by someone?: No     Within the past 12 months, have you been humiliated or emotionally abused in other ways by your partner or ex-partner?: No   Housing Stability: Low Risk  (1/14/2024)    Housing Stability     Do you have housing? : Yes     Are you worried about losing your housing?: No       Current Outpatient Medications   Medication    norgestimate-ethinyl estradiol (ORTHO-CYCLEN) 0.25-35 MG-MCG tablet    albuterol (PROVENTIL) (2.5 MG/3ML) 0.083% neb solution    albuterol (VENTOLIN HFA) 108 (90 Base) MCG/ACT inhaler    etonogestrel (NEXPLANON) 68 MG IMPL    fexofenadine (ALLEGRA) 180 MG tablet    hydrOXYzine HCl (ATARAX) 25 MG tablet    montelukast (SINGULAIR) 10 MG tablet    ondansetron (ZOFRAN) 4 MG tablet    SUMAtriptan (IMITREX) 50 MG tablet    topiramate (TOPAMAX) 25 MG tablet     No current facility-administered medications for this visit.          Allergies   Allergen Reactions    Other [No Clinical Screening - See Comments]      DESENSITIZATION SHOTS- had a  systemic reaction after allergy shots. Has an epi pen for this.            Review of Systems  Constitutional, HEENT, cardiovascular, pulmonary, gi and gu systems are negative, except as otherwise noted.      Objective    BP (!) 145/92   Pulse 102   Temp 97.8  F (36.6  C)   Wt 121.1 kg (267 lb)   LMP  (LMP Unknown)   BMI 36.40 kg/m    Body mass index is 36.4 kg/m .  Physical Exam   GENERAL: alert and no distress  ABDOMEN: soft, nontender, no hepatosplenomegaly, no masses and bowel sounds normal   (female): normal female external genitalia, normal urethral meatus, normal vaginal mucosa  MS: no gross musculoskeletal defects noted, no edema. Nexplanon is palpable at insertion site. Scar from insertion is inferior to previous site by a few centimeters. Nexplanon can be raised to incision with pressure and should be uncomplicated to remove by skilled provider.  PSYCH: mentation appears normal, affect normal/bright            Signed Electronically by: Iqra Teixeira MD

## 2024-02-16 LAB
C TRACH DNA SPEC QL NAA+PROBE: NEGATIVE
N GONORRHOEA DNA SPEC QL NAA+PROBE: NEGATIVE

## 2024-02-20 LAB
BKR LAB AP GYN ADEQUACY: NORMAL
BKR LAB AP GYN INTERPRETATION: NORMAL
BKR LAB AP HPV REFLEX: NO
BKR LAB AP PREVIOUS ABNORMAL: NORMAL
PATH REPORT.COMMENTS IMP SPEC: NORMAL
PATH REPORT.COMMENTS IMP SPEC: NORMAL
PATH REPORT.RELEVANT HX SPEC: NORMAL

## 2024-05-06 ENCOUNTER — HOSPITAL ENCOUNTER (EMERGENCY)
Facility: HOSPITAL | Age: 24
Discharge: HOME | End: 2024-05-06
Attending: EMERGENCY MEDICINE | Admitting: EMERGENCY MEDICINE
Payer: COMMERCIAL

## 2024-05-06 ENCOUNTER — APPOINTMENT (OUTPATIENT)
Dept: ULTRASOUND IMAGING | Facility: HOSPITAL | Age: 24
End: 2024-05-06
Attending: STUDENT IN AN ORGANIZED HEALTH CARE EDUCATION/TRAINING PROGRAM
Payer: COMMERCIAL

## 2024-05-06 VITALS
DIASTOLIC BLOOD PRESSURE: 63 MMHG | RESPIRATION RATE: 16 BRPM | TEMPERATURE: 97.8 F | SYSTOLIC BLOOD PRESSURE: 126 MMHG | BODY MASS INDEX: 36.4 KG/M2 | OXYGEN SATURATION: 97 % | HEART RATE: 77 BPM | WEIGHT: 260 LBS | HEIGHT: 71 IN

## 2024-05-06 DIAGNOSIS — N93.9 EPISODE OF HEAVY VAGINAL BLEEDING: ICD-10-CM

## 2024-05-06 LAB
ABO/RH(D): NORMAL
ALBUMIN UR-MCNC: 70 MG/DL
ANION GAP SERPL CALCULATED.3IONS-SCNC: 11 MMOL/L (ref 7–15)
ANTIBODY SCREEN: NEGATIVE
APPEARANCE UR: ABNORMAL
BASOPHILS # BLD AUTO: 0.1 10E3/UL (ref 0–0.2)
BASOPHILS NFR BLD AUTO: 1 %
BILIRUB UR QL STRIP: NEGATIVE
BUN SERPL-MCNC: 7.4 MG/DL (ref 6–20)
CALCIUM SERPL-MCNC: 10.1 MG/DL (ref 8.6–10)
CHLORIDE SERPL-SCNC: 105 MMOL/L (ref 98–107)
COLOR UR AUTO: ABNORMAL
CREAT SERPL-MCNC: 0.92 MG/DL (ref 0.51–0.95)
DEPRECATED HCO3 PLAS-SCNC: 22 MMOL/L (ref 22–29)
EGFRCR SERPLBLD CKD-EPI 2021: 89 ML/MIN/1.73M2
EOSINOPHIL # BLD AUTO: 0.2 10E3/UL (ref 0–0.7)
EOSINOPHIL NFR BLD AUTO: 3 %
ERYTHROCYTE [DISTWIDTH] IN BLOOD BY AUTOMATED COUNT: 11.9 % (ref 10–15)
GLUCOSE SERPL-MCNC: 84 MG/DL (ref 70–99)
GLUCOSE UR STRIP-MCNC: NEGATIVE MG/DL
HCG SERPL QL: NEGATIVE
HCT VFR BLD AUTO: 41.9 % (ref 35–47)
HGB BLD-MCNC: 14 G/DL (ref 11.7–15.7)
HGB UR QL STRIP: ABNORMAL
HOLD SPECIMEN: NORMAL
IMM GRANULOCYTES # BLD: 0 10E3/UL
IMM GRANULOCYTES NFR BLD: 0 %
KETONES UR STRIP-MCNC: NEGATIVE MG/DL
LEUKOCYTE ESTERASE UR QL STRIP: ABNORMAL
LYMPHOCYTES # BLD AUTO: 2.9 10E3/UL (ref 0.8–5.3)
LYMPHOCYTES NFR BLD AUTO: 35 %
MCH RBC QN AUTO: 31.5 PG (ref 26.5–33)
MCHC RBC AUTO-ENTMCNC: 33.4 G/DL (ref 31.5–36.5)
MCV RBC AUTO: 94 FL (ref 78–100)
MONOCYTES # BLD AUTO: 0.4 10E3/UL (ref 0–1.3)
MONOCYTES NFR BLD AUTO: 5 %
NEUTROPHILS # BLD AUTO: 4.7 10E3/UL (ref 1.6–8.3)
NEUTROPHILS NFR BLD AUTO: 56 %
NITRATE UR QL: NEGATIVE
NRBC # BLD AUTO: 0 10E3/UL
NRBC BLD AUTO-RTO: 0 /100
PH UR STRIP: 5.5 [PH] (ref 5–7)
PLATELET # BLD AUTO: 370 10E3/UL (ref 150–450)
POTASSIUM SERPL-SCNC: 4.3 MMOL/L (ref 3.4–5.3)
RBC # BLD AUTO: 4.45 10E6/UL (ref 3.8–5.2)
RBC URINE: >182 /HPF
SODIUM SERPL-SCNC: 138 MMOL/L (ref 135–145)
SP GR UR STRIP: 1.01 (ref 1–1.03)
SPECIMEN EXPIRATION DATE: NORMAL
UROBILINOGEN UR STRIP-MCNC: <2 MG/DL
WBC # BLD AUTO: 8.3 10E3/UL (ref 4–11)
WBC URINE: 0 /HPF

## 2024-05-06 PROCEDURE — 85004 AUTOMATED DIFF WBC COUNT: CPT | Performed by: PHYSICIAN ASSISTANT

## 2024-05-06 PROCEDURE — 86900 BLOOD TYPING SEROLOGIC ABO: CPT | Performed by: PHYSICIAN ASSISTANT

## 2024-05-06 PROCEDURE — 99284 EMERGENCY DEPT VISIT MOD MDM: CPT | Mod: 25

## 2024-05-06 PROCEDURE — 76856 US EXAM PELVIC COMPLETE: CPT

## 2024-05-06 PROCEDURE — 80048 BASIC METABOLIC PNL TOTAL CA: CPT | Performed by: PHYSICIAN ASSISTANT

## 2024-05-06 PROCEDURE — 81001 URINALYSIS AUTO W/SCOPE: CPT | Performed by: EMERGENCY MEDICINE

## 2024-05-06 PROCEDURE — 87086 URINE CULTURE/COLONY COUNT: CPT | Performed by: EMERGENCY MEDICINE

## 2024-05-06 PROCEDURE — 76830 TRANSVAGINAL US NON-OB: CPT

## 2024-05-06 PROCEDURE — 84703 CHORIONIC GONADOTROPIN ASSAY: CPT | Performed by: PHYSICIAN ASSISTANT

## 2024-05-06 PROCEDURE — 36415 COLL VENOUS BLD VENIPUNCTURE: CPT | Performed by: PHYSICIAN ASSISTANT

## 2024-05-06 ASSESSMENT — COLUMBIA-SUICIDE SEVERITY RATING SCALE - C-SSRS
1. IN THE PAST MONTH, HAVE YOU WISHED YOU WERE DEAD OR WISHED YOU COULD GO TO SLEEP AND NOT WAKE UP?: NO
6. HAVE YOU EVER DONE ANYTHING, STARTED TO DO ANYTHING, OR PREPARED TO DO ANYTHING TO END YOUR LIFE?: NO
2. HAVE YOU ACTUALLY HAD ANY THOUGHTS OF KILLING YOURSELF IN THE PAST MONTH?: NO

## 2024-05-06 NOTE — ED PROVIDER NOTES
Emergency Department Encounter   NAME: Kashmir Vazquez ; AGE: 24 year old female ; YOB: 2000 ; MRN: [id number] ; PCP: Mayda Farnsworth   ED PROVIDER: Julia Woodward PA-C    Evaluation Date & Time:   No admission date for patient encounter.    CHIEF COMPLAINT:  Vaginal Bleeding        Impression and Plan   FINAL IMPRESSION:    ICD-10-CM    1. Episode of heavy vaginal bleeding  N93.9           MDM:  Kashmir Vazquez is a 24 year old female with PMH of asthma, dysmenorrhea, menorrhagia, and migraines presenting to the emergency department with her mother for evaluation of heavy vaginal bleeding.  She states she is on her second round of Nexplanon, this was last placed in 2022.  She states with her first Nexplanon she never had any issues with bleeding but this second Nexplanon she has had issues with breakthrough bleeding. She saw OB/GYN on 12/15 and they started her on Ortho-Cyclen to help with bleeding.  She ran out of the birth control last week, however, and the last 4 days she has had much heavier than normal with large clots. She states soaking through about 4 pads per day.  Also endorses occasional nausea and lightheadedness, denies fainting or feeling like she is going to faint.  No fevers, chills, or abdominal pain.  Denies any flank pain or urinary symptoms.  No new vaginal discharge.  She has not been sexually active in more than 2 years.    Vitals reviewed and unremarkable aside from a BP of 152/82.  Temp 97.8 F. On exam she is resting comfortably, nontoxic-appearing. Differential diagnosis includes but not limited to uterine polyps, fibroids, thrombocytopenia, anemia, pregnancy, ectopic pregnancy, UTI, endometritis. CBC finds no evidence of anemia or leukocytosis. She does not have any reproducible abdominal pain, leukocytosis, or fever today and I do not feel that any abdominal imaging is indicated. Chart review finds that patient has never had transvaginal ultrasound but I offered this today  to further evaluate her heavy bleeding, she is agreeable to this. TVUS is negative for any evidence of fibroids, polyps, endometrial thickening, or ovarian cysts. UA shows presence of blood. There are 250 leukocyte esterase but she does not endorse any urinary symptoms today. I suspect this is more likely to be contaminant than due to urinary tract infection. Her urine has reflexed to culture, will wait to see if urine culture grows anything before starting treatment. Gonorrhea and chlamydia swabs performed in February when she had her Pap smear were negative and she has not been sexually active since, I do not feel that repeating the swabs is indicated today. I suspect this is most likely break through bleeding due to her Nexplanon. I recommended patient call her OB/GYN's office to schedule follow-up in clinic. Chart review finds that she called her OB/GYN office yesterday for a refill of her OCP, recommended she restart this as well as this seemed to help with breakthrough bleeding in the past. Return precautions were provided, patient and her mother are understanding and agreeable to this plan.        History:  Supplemental history from: Documented in chart  External Record(s) reviewed: Documented in chart and Outpatient Record: [facility] on 2/15/24 f    Work Up:  Chart documentation includes differential considered and any EKGs or imaging independently interpreted by provider, where specified.  In additional to work up documented, I considered the following work up: Documented in chart, if applicable.    External consultation:  Discussion of management with another provider: Dr. Nance    Complicating factors:  Care impacted by chronic illness: Other: asthma, dysmenorrhea, migraine   Care affected by social determinants of health: N/A    Disposition considerations: Discharge. No recommendations on prescription strength medication(s). See documentation for any additional  details.      ED COURSE:  4:13 PM I met and introduced myself to the patient. I gathered initial history and performed my physical exam. We discussed plan for initial workup.   5:50 PM PM I have staffed the patient with Dr. Nance, ED MD, who has evaluated the patient and agrees with all aspects of today's care.   6:36 PM I rechecked the patient and discussed results, discharge, follow up, and reasons to return to the ED.     At the conclusion of the encounter I discussed the results of all the tests and the disposition. The questions were answered. The patient or family acknowledged understanding and was agreeable with the care plan.        MEDICATIONS GIVEN IN THE EMERGENCY DEPARTMENT:  Medications - No data to display      NEW PRESCRIPTIONS STARTED AT TODAY'S ED VISIT:  New Prescriptions    No medications on file         HPI   Patient information was obtained from: patient    Use of Intrepreter: N/A     Kashmir Vazquez is a 24 year old female with a pertinent history of asthma, dysmenorrhea, migraine who presents to the ED by personal vehicle with mother for evaluation of vaginal bleeding.     Per chart review:   Patient was seen at [facility] on 2/15/24 for breakthrough bleeding on nexplanon. Will be screened for STDs and plan to have a pap smear.     Patient reports increased heavy vaginal bleeding for the past four days (5/2/24) with pelvic pain, light-headedness, and dizziness. Does have a Nexplanon that causes constant spotting since giving it changes on 2/14/22 but worse recently. Patient has been using lidocaine patches on her abdomen. Was on oral birth control but ran out 2 weeks ago. This was helping spotting. Denies getting periods. Denies sexually active. Denies vaginal discharge, vaginal itching, nausea, vomiting, diarrhea, dysuria, urinary frequency.     Patient does have a history of migraines.         Medical History     Past Medical History:   Diagnosis Date    Asthma  "       Past Surgical History:   Procedure Laterality Date    NO HISTORY OF SURGERY      ORTHOPEDIC SURGERY         Family History   Problem Relation Age of Onset    Migraines Mother     Asthma Mother     Diabetes Maternal Grandmother     Colon Cancer Maternal Grandmother     Family History Negative No family hx of        Social History     Tobacco Use    Smoking status: Never    Smokeless tobacco: Never   Substance Use Topics    Alcohol use: No    Drug use: No       albuterol (PROVENTIL) (2.5 MG/3ML) 0.083% neb solution  albuterol (VENTOLIN HFA) 108 (90 Base) MCG/ACT inhaler  etonogestrel (NEXPLANON) 68 MG IMPL  fexofenadine (ALLEGRA) 180 MG tablet  hydrOXYzine HCl (ATARAX) 25 MG tablet  montelukast (SINGULAIR) 10 MG tablet  norgestimate-ethinyl estradiol (ORTHO-CYCLEN) 0.25-35 MG-MCG tablet  ondansetron (ZOFRAN) 4 MG tablet  SUMAtriptan (IMITREX) 50 MG tablet  topiramate (TOPAMAX) 25 MG tablet          Physical Exam     First Vitals:  Patient Vitals for the past 24 hrs:   BP Temp Temp src Pulse Resp SpO2 Height Weight   05/06/24 1429 (!) 152/82 97.8  F (36.6  C) Temporal 87 16 97 % 1.803 m (5' 11\") 117.9 kg (260 lb)         PHYSICAL EXAM    General Appearance:  Alert, cooperative, no distress, appears stated age. Resting comfortably, nontoxic-appearing.  HENT: Normocephalic without obvious deformity, atraumatic. Mucous membranes moist   Respiratory: No distress. Lungs clear to ausculation bilaterally. No wheezes, rhonchi or stridor  Cardiovascular: Regular rate and rhythm, no murmur. Normal cap refill. No peripheral edema  GI: Abdomen soft, nontender, normal bowel sounds  : No CVA tenderness  Musculoskeletal: Moving all extremities. No gross deformities  Integument: Warm, dry, no rashes or lesions  Neurologic: Alert and orientated x3. No focal deficits.  Psych: Normal mood and affect        Results     LAB:  All pertinent labs reviewed and interpreted  Labs Ordered and Resulted from Time of ED Arrival to Time of " ED Departure   BASIC METABOLIC PANEL - Abnormal       Result Value    Sodium 138      Potassium 4.3      Chloride 105      Carbon Dioxide (CO2) 22      Anion Gap 11      Urea Nitrogen 7.4      Creatinine 0.92      GFR Estimate 89      Calcium 10.1 (*)     Glucose 84     ROUTINE UA WITH MICROSCOPIC REFLEX TO CULTURE - Abnormal    Color Urine Brown (*)     Appearance Urine Cloudy (*)     Glucose Urine Negative      Bilirubin Urine Negative      Ketones Urine Negative      Specific Gravity Urine 1.015      Blood Urine >1.0 mg/dL (*)     pH Urine 5.5      Protein Albumin Urine 70 (*)     Urobilinogen Urine <2.0      Nitrite Urine Negative      Leukocyte Esterase Urine 250 Carlos/uL (*)     RBC Urine >182 (*)     WBC Urine 0     HCG QUALITATIVE PREGNANCY - Normal    hCG Serum Qualitative Negative     CBC WITH PLATELETS AND DIFFERENTIAL    WBC Count 8.3      RBC Count 4.45      Hemoglobin 14.0      Hematocrit 41.9      MCV 94      MCH 31.5      MCHC 33.4      RDW 11.9      Platelet Count 370      % Neutrophils 56      % Lymphocytes 35      % Monocytes 5      % Eosinophils 3      % Basophils 1      % Immature Granulocytes 0      NRBCs per 100 WBC 0      Absolute Neutrophils 4.7      Absolute Lymphocytes 2.9      Absolute Monocytes 0.4      Absolute Eosinophils 0.2      Absolute Basophils 0.1      Absolute Immature Granulocytes 0.0      Absolute NRBCs 0.0     TYPE AND SCREEN, ADULT    ABO/RH(D) A POS      Antibody Screen Negative      SPECIMEN EXPIRATION DATE [id number]     URINE CULTURE   ABO/RH TYPE AND SCREEN       RADIOLOGY:  US Pelvic Complete with Transvaginal   Final Result   IMPRESSION:   1.  Normal pelvic ultrasound.                     ECG:  N/A      PROCEDURES:  N/A        IFaith, am serving as a scribe to document services personally performed by Julia Woodward PA-C, based on my observation and the provider's statements to me. I, Julia Woodward PA-C attest that Faith Banegas is acting in a scribe  capacity, has observed my performance of the services and has documented them in accordance with my direction.       Julia Woodward PA-C   Emergency Medicine   [facility] EMERGENCY DEPARTMENT       Julia Woodward PA-C  05/06/24 4640

## 2024-05-06 NOTE — DISCHARGE INSTRUCTIONS
You were seen in the emergency department today for heavy vaginal bleeding.  Your lab work today here is all unremarkable, no evidence of anemia radiating to receive blood today.  Your ultrasound is also normal    Please restart your OCP and follow-up with your OB/GYN    Return to the emergency department if you develop much heavier vaginal bleeding, feel faint or do faint, or develop severe abdominal/groin cramping

## 2024-05-06 NOTE — ED PROVIDER NOTES
Emergency Department Midlevel Supervisory Note     I had a face to face encounter with this patient seen by the Advanced Practice Provider (RADHA). I personally made/approved the management plan and take responsibility for the patient management. I personally saw patient and performed a substantive portion of the visit including all aspects of the medical decision making.     ED Course:  5:14 PM Julia Woodward PA-C staffed patient with me. I agree with their assessment and plan of management, and I will see the patient.  6:34 PM I met with the patient to introduce myself, gather additional history, perform my initial exam, and discuss the plan.     Brief HPI:     Kashmir Vazquez is a 24 year old female who presents for evaluation of heavy vaginal bleeding ongoing for the past four days (since 5/2/24) with pelvic pain, light-headedness, and dizziness. Does have a Nexplanon that causes constant spotting since giving it changes on 2/14/22 but worse recently. She has been using lidocaine patches on abdomen. She was on oral birth control but ran out 2 weeks ago.  She states that she had refills available but had not called them and because she thought that maybe she had stabilized and would no longer need the additional birth control.  Her bleeding then started about a week to a week and a half ago and has had episodes where it has been heavier and then lighter since then but over the last couple of days has been heavier passing clots.  Has not passed out.  Denies sexually active. No vaginal discharge, vaginal itching, nausea, vomiting, diarrhea, dysuria, urinary frequency, or other concerns at this time.    I, Kathleen Diggs, am serving as a scribe to document services personally performed by Rena Nance MD, based on my observations and the provider's statements to me.   I, Rena Nance MD attest that Kathleen Diggs was acting in a scribe capacity, has observed my performance of the services and has documented them  "in accordance with my direction.    Brief Physical Exam: BP (!) 152/82   Pulse 87   Temp 97.8  F (36.6  C) (Temporal)   Resp 16   Ht 1.803 m (5' 11\")   Wt 117.9 kg (260 lb)   SpO2 97%   BMI 36.26 kg/m    Constitutional:  Alert, in no acute distress  EYES: Conjunctivae clear  HENT:  Atraumatic  Respiratory:  Respirations even, unlabored, in no acute respiratory distress  Cardiovascular:  Regular rate and rhythm, good peripheral perfusion  GI: Soft, non-distended, non-tender  Musculoskeletal:  Moves all 4 extremities equally, grossly symmetrical strength  Integument: Warm & dry. No appreciable rash, erythema.  Neurologic:  Alert & oriented, speech clear and fluent, no focal deficits noted  Psych: Normal mood and affect       MDM:  I did see the patient but she had been at ultrasound when I was initially consulted.  So, I saw her after the ultrasound.  She has normal ultrasound so there is no fibroid or ovarian cyst that should be complicating her treatment with the Nexplanon or the birth control.  Obviously she is not pregnant so we are not conference urn for ectopic care.  She has no symptoms of UTI and the leukocyte esterase in her urine is due to the vaginal bleeding that got into the urine sample.  So, at this point she feels fine she is not having any severe pain and so does feel that she can be discharged home.  She did talk with her pharmacy already and her refill is being sent to her.  She will follow-up with her OB/GYN to discuss her goals of care with her Nexplanon and her birth control.       1. Episode of heavy vaginal bleeding          Labs and Imaging:  Results for orders placed or performed during the hospital encounter of 05/06/24   US Pelvic Complete with Transvaginal    Impression    IMPRESSION:  1.  Normal pelvic ultrasound.         Basic metabolic panel   Result Value Ref Range    Sodium 138 135 - 145 mmol/L    Potassium 4.3 3.4 - 5.3 mmol/L    Chloride 105 98 - 107 mmol/L    Carbon Dioxide " (CO2) 22 22 - 29 mmol/L    Anion Gap 11 7 - 15 mmol/L    Urea Nitrogen 7.4 6.0 - 20.0 mg/dL    Creatinine 0.92 0.51 - 0.95 mg/dL    GFR Estimate 89 >60 mL/min/1.73m2    Calcium 10.1 (H) 8.6 - 10.0 mg/dL    Glucose 84 70 - 99 mg/dL   HCG QUALitative pregnancy (blood)   Result Value Ref Range    hCG Serum Qualitative Negative Negative   UA with Microscopic reflex to Culture    Specimen: Urine, Midstream   Result Value Ref Range    Color Urine Brown (A) Colorless, Straw, Light Yellow, Yellow    Appearance Urine Cloudy (A) Clear    Glucose Urine Negative Negative mg/dL    Bilirubin Urine Negative Negative    Ketones Urine Negative Negative mg/dL    Specific Gravity Urine 1.015 1.001 - 1.030    Blood Urine >1.0 mg/dL (A) Negative    pH Urine 5.5 5.0 - 7.0    Protein Albumin Urine 70 (A) Negative mg/dL    Urobilinogen Urine <2.0 <2.0 mg/dL    Nitrite Urine Negative Negative    Leukocyte Esterase Urine 250 Carlos/uL (A) Negative    RBC Urine >182 (H) <=2 /HPF    WBC Urine 0 <=5 /HPF   Extra Blue Top Tube   Result Value Ref Range    Hold Specimen JIC    CBC with platelets and differential   Result Value Ref Range    WBC Count 8.3 4.0 - 11.0 10e3/uL    RBC Count 4.45 3.80 - 5.20 10e6/uL    Hemoglobin 14.0 11.7 - 15.7 g/dL    Hematocrit 41.9 35.0 - 47.0 %    MCV 94 78 - 100 fL    MCH 31.5 26.5 - 33.0 pg    MCHC 33.4 31.5 - 36.5 g/dL    RDW 11.9 10.0 - 15.0 %    Platelet Count 370 150 - 450 10e3/uL    % Neutrophils 56 %    % Lymphocytes 35 %    % Monocytes 5 %    % Eosinophils 3 %    % Basophils 1 %    % Immature Granulocytes 0 %    NRBCs per 100 WBC 0 <1 /100    Absolute Neutrophils 4.7 1.6 - 8.3 10e3/uL    Absolute Lymphocytes 2.9 0.8 - 5.3 10e3/uL    Absolute Monocytes 0.4 0.0 - 1.3 10e3/uL    Absolute Eosinophils 0.2 0.0 - 0.7 10e3/uL    Absolute Basophils 0.1 0.0 - 0.2 10e3/uL    Absolute Immature Granulocytes 0.0 <=0.4 10e3/uL    Absolute NRBCs 0.0 10e3/uL   Adult Type and Screen   Result Value Ref Range    ABO/RH(D) A POS      Antibody Screen Negative Negative    SPECIMEN EXPIRATION DATE [id number]        I have reviewed the relevant laboratory studies above.    I independently interpreted the following imaging study(s):   ultrasound      Procedures:  I was present for the key portions of procedures documented in RAD[facility]/midlevel note, see midlevel note for further details.    Rena Nance MD  [facility] EMERGENCY DEPARTMENT  [address] [place] [id number]  [phone]      Rena Nance MD  05/06/24 7029

## 2024-05-06 NOTE — ED TRIAGE NOTES
The patient states that she has had heavy vaginal bleeding x 4 days, going through 4 pads daily. Pt had a lot of clots last week. Denies any recent pregnancies. Pt has had intermittent dizziness. Pt is on nexplonon.      Triage Assessment (Adult)       Row Name 05/06/24 1431          Triage Assessment    Airway WDL WDL        Respiratory WDL    Respiratory WDL WDL        Skin Circulation/Temperature WDL    Skin Circulation/Temperature WDL WDL        Cardiac WDL    Cardiac WDL WDL     Cardiac Rhythm NSR        Peripheral/Neurovascular WDL    Peripheral Neurovascular WDL WDL        Cognitive/Neuro/Behavioral WDL    Cognitive/Neuro/Behavioral WDL WDL

## 2024-05-07 LAB — BACTERIA UR CULT: NORMAL

## 2024-05-09 ENCOUNTER — OFFICE VISIT (OUTPATIENT)
Dept: OBGYN | Facility: CLINIC | Age: 24
End: 2024-05-09
Payer: COMMERCIAL

## 2024-05-09 VITALS
TEMPERATURE: 98.6 F | BODY MASS INDEX: 35.7 KG/M2 | HEART RATE: 107 BPM | DIASTOLIC BLOOD PRESSURE: 96 MMHG | WEIGHT: 256 LBS | SYSTOLIC BLOOD PRESSURE: 148 MMHG

## 2024-05-09 DIAGNOSIS — Z30.41 ORAL CONTRACEPTIVE PILL SURVEILLANCE: Primary | ICD-10-CM

## 2024-05-09 DIAGNOSIS — Z30.46 NEXPLANON REMOVAL: ICD-10-CM

## 2024-05-09 PROCEDURE — 11982 REMOVE DRUG IMPLANT DEVICE: CPT | Performed by: OBSTETRICS & GYNECOLOGY

## 2024-05-09 PROCEDURE — 99213 OFFICE O/P EST LOW 20 MIN: CPT | Mod: 25 | Performed by: OBSTETRICS & GYNECOLOGY

## 2024-05-09 RX ORDER — LEVONORGESTREL AND ETHINYL ESTRADIOL 0.15-0.03
1 KIT ORAL DAILY
Qty: 84 TABLET | Refills: 3 | Status: SHIPPED | OUTPATIENT
Start: 2024-05-09

## 2024-05-09 NOTE — PROGRESS NOTES
Assessment & Plan     Oral contraceptive pill surveillance  Discussed options  Would like to go back to seasonale.     - levonorgestrel-ethinyl estradiol (SEASONALE) 0.15-0.03 MG tablet; Take 1 tablet by mouth daily    Nexplanon removal    - REMOVAL NEXPLANON    Procedure: Nexplanon removal  PARQ was held and consents signed. Patient was placed in dorsal supine position with left arm abducted and externally rotated. Nexplanon was palpated under skin. The area was cleansed with betadine. The distal site was injected with 1 ml of 1% plain lidocaine.  While pushing down on the proximal end, 2 mm incision was made over the distal implant with an 11 blade scalpel. The implant was grasped with a mosquito forceps and removed intact. The skin was closed with dermabond. A pressure bandage was placed for the next 6 hours. The patient tolerated the procedure well.     Review of the result(s) of each unique test - ultrasound, CBC  Prescription drug management              No follow-ups on file.    Deidre Marlow is a 24 year old, presenting for the following health issues:  Bleeding with Nexplanon    HPI   Presents for contraceptive management.   Has been having frequent bleeding on nexplanon. Did trial of OCPs and bleeding improved. When she stopped she had very heavy bleeding and went to ER  Ultrasound normal.   Would like to just do OCPs. Did seasonale prior to nexplanon    Past Medical History:   Diagnosis Date    Asthma        Past Surgical History:   Procedure Laterality Date    NO HISTORY OF SURGERY      ORTHOPEDIC SURGERY         Family History   Problem Relation Age of Onset    Migraines Mother     Asthma Mother     Diabetes Maternal Grandmother     Colon Cancer Maternal Grandmother     Family History Negative No family hx of        Social History     Socioeconomic History    Marital status: Single     Spouse name: Not on file    Number of children: Not on file    Years of education: Not on file    Highest  education level: Not on file   Occupational History    Not on file   Tobacco Use    Smoking status: Never    Smokeless tobacco: Never   Substance and Sexual Activity    Alcohol use: No    Drug use: No    Sexual activity: Never   Other Topics Concern    Parent/sibling w/ CABG, MI or angioplasty before 65F 55M? No   Social History Narrative    Not on file     Social Determinants of Health     Financial Resource Strain: Low Risk  (1/14/2024)    Financial Resource Strain     Within the past 12 months, have you or your family members you live with been unable to get utilities (heat, electricity) when it was really needed?: No   Food Insecurity: Low Risk  (1/14/2024)    Food Insecurity     Within the past 12 months, did you worry that your food would run out before you got money to buy more?: No     Within the past 12 months, did the food you bought just not last and you didn t have money to get more?: No   Transportation Needs: Low Risk  (1/14/2024)    Transportation Needs     Within the past 12 months, has lack of transportation kept you from medical appointments, getting your medicines, non-medical meetings or appointments, work, or from getting things that you need?: No   Physical Activity: Not on file   Stress: Not on file   Social Connections: Not on file   Interpersonal Safety: Low Risk  (1/15/2024)    Interpersonal Safety     Do you feel physically and emotionally safe where you currently live?: Yes     Within the past 12 months, have you been hit, slapped, kicked or otherwise physically hurt by someone?: No     Within the past 12 months, have you been humiliated or emotionally abused in other ways by your partner or ex-partner?: No   Housing Stability: Low Risk  (1/14/2024)    Housing Stability     Do you have housing? : Yes     Are you worried about losing your housing?: No       Current Outpatient Medications   Medication Sig Dispense Refill    albuterol (PROVENTIL) (2.5 MG/3ML) 0.083% neb solution Take 1 vial  (2.5 mg) by nebulization every 6 hours as needed for shortness of breath / dyspnea or wheezing 60 mL 1    albuterol (VENTOLIN HFA) 108 (90 Base) MCG/ACT inhaler INHALE TWO PUFFS BY MOUTH EVERY 4 HOURS AS NEEDED FOR SHORTNESS OF BREATH OR DYSPNEA 18 g 3    etonogestrel (NEXPLANON) 68 MG IMPL 1 each (68 mg) by Subdermal route once      fexofenadine (ALLEGRA) 180 MG tablet Take 180 mg by mouth daily      hydrOXYzine HCl (ATARAX) 25 MG tablet Take 1 tablet (25 mg) by mouth 3 times daily as needed for anxiety 90 tablet 0    montelukast (SINGULAIR) 10 MG tablet Take 1 tablet (10 mg) by mouth at bedtime 90 tablet 3    norgestimate-ethinyl estradiol (ORTHO-CYCLEN) 0.25-35 MG-MCG tablet Take 1 tablet by mouth daily 84 tablet 3    ondansetron (ZOFRAN) 4 MG tablet Take 1 tablet (4 mg) by mouth every 8 hours as needed for nausea 20 tablet 0    SUMAtriptan (IMITREX) 50 MG tablet Take 1 tablet (50 mg) by mouth at onset of headache for migraine May repeat in 2 hours. Try to limit to 3-4 tablets per week. 18 tablet 3    topiramate (TOPAMAX) 25 MG tablet Take 1 tablet (25 mg) by mouth 2 times daily 180 tablet 3     No current facility-administered medications for this visit.          Allergies   Allergen Reactions    Other [No Clinical Screening - See Comments]      DESENSITIZATION SHOTS- had a systemic reaction after allergy shots. Has an epi pen for this.            Review of Systems  Constitutional, HEENT, cardiovascular, pulmonary, gi and gu systems are negative, except as otherwise noted.      Objective    BP (!) 148/96   Pulse 107   Temp 98.6  F (37  C)   Wt 116.1 kg (256 lb)   BMI 35.70 kg/m    Body mass index is 35.7 kg/m .  Physical Exam   GENERAL: alert and no distress  MS: no gross musculoskeletal defects noted, no edema  PSYCH: mentation appears normal, affect normal/bright    Component      Latest Ref Rn 5/6/2024  3:29 PM   WBC      4.0 - 11.0 10e3/uL 8.3    RBC Count      3.80 - 5.20 10e6/uL 4.45    Hemoglobin       11.7 - 15.7 g/dL 14.0    Hematocrit      35.0 - 47.0 % 41.9    MCV      78 - 100 fL 94    MCH      26.5 - 33.0 pg 31.5    MCHC      31.5 - 36.5 g/dL 33.4    RDW      10.0 - 15.0 % 11.9    Platelet Count      150 - 450 10e3/uL 370      EXAM: US PELVIC TRANSABDOMINAL AND TRANSVAGINAL  LOCATION: [facility]  DATE: 5/6/2024     INDICATION: heavy vaginal bleeding  COMPARISON: None.  TECHNIQUE: Transabdominal scans were performed. Endovaginal ultrasound was performed to better visualize the adnexa.     FINDINGS:     UTERUS: 7.9 x 5.7 x 3.2 cm. Normal in size and position with no masses.     ENDOMETRIUM: 7 mm. Normal smooth endometrium.     RIGHT OVARY: 1.9 x 1.5 x 2.7 cm. Normal.     LEFT OVARY: 1.7 x 1.3 x 2.2 cm. Normal.     No significant free fluid.                                                                      IMPRESSION:  1.  Normal pelvic ultrasound.        Signed Electronically by: Iqra Teixeira MD

## 2024-08-28 ENCOUNTER — MYC MEDICAL ADVICE (OUTPATIENT)
Dept: OBGYN | Facility: CLINIC | Age: 24
End: 2024-08-28
Payer: COMMERCIAL

## 2024-08-28 DIAGNOSIS — N92.1 BREAKTHROUGH BLEEDING ON NEXPLANON: ICD-10-CM

## 2024-08-28 DIAGNOSIS — Z97.5 BREAKTHROUGH BLEEDING ON NEXPLANON: ICD-10-CM

## 2024-08-28 RX ORDER — NORGESTIMATE AND ETHINYL ESTRADIOL 0.25-0.035
1 KIT ORAL DAILY
Qty: 84 TABLET | Refills: 3 | Status: SHIPPED | OUTPATIENT
Start: 2024-08-28

## 2024-08-28 NOTE — TELEPHONE ENCOUNTER
Office visit with Dr Teixeria 5/9/24, nexplanon removal, OCP started. Has been on levonorgestrel-ethinyl estradiol (SEASONALE) 0.15-0.03 MG tablet since May 2024, prior to that had Nexplanon, used norgestimate-ethinyl estradiol (ORTHO-CYCLEN) 0.25-35 MG-MCG tablet for some breakthrough bleeding while on nexplanon and that helped. With the Seasonale she is still having daily dark red/brown spotting and clots. She would like to switch to ortho-cyclen.    Routing to MD Gus griffin to switch or needs a visit?     Jeanette BARKSDALE RN   [facility] Triage RN

## 2024-11-18 NOTE — TELEPHONE ENCOUNTER
Med plan for aspirin faxed to Dr Costello's office; confirmation received.  Last OVN requested from from Karla's office.    Patient has not returned for allergy injections since 10/25/2017. LOV with Dr. Avelar 12/22/2017, where orders were given to have green vial mixed down to silver and patient to restart allergy injections. Contacted patient's parent to determine if they will be resuming allergy injections, as green vial expires 5/16/2018. Spoke to Akanksha, patient's mother who states that their schedule has been very busy, and they decided that they would not continue with allergy injections. Discussed that if they choose to resume allergy injections that they will call ahead of time, as her vials are expiring. She verbalized understanding.     Marely Watson RN on 4/16/2018 at 8:15 AM

## 2025-03-08 ENCOUNTER — HEALTH MAINTENANCE LETTER (OUTPATIENT)
Age: 25
End: 2025-03-08

## 2025-03-18 ENCOUNTER — TELEPHONE (OUTPATIENT)
Dept: FAMILY MEDICINE | Facility: CLINIC | Age: 25
End: 2025-03-18
Payer: COMMERCIAL

## 2025-03-18 NOTE — TELEPHONE ENCOUNTER
Patient Quality Outreach    Patient is due for the following:   Asthma  -  ACT needed  Physical Preventive Adult Physical      Topic Date Due    Pneumococcal Vaccine (1 of 2 - PCV) 02/01/2020    Meningitis B Vaccine (2 of 2 - Bexsero SCDM 2-dose series) 01/22/2021    Flu Vaccine (1) 09/01/2024    COVID-19 Vaccine (3 - 2024-25 season) 09/01/2024     Chlamydia Screening    Action(s) Taken:   Schedule a office visit for Asthma and ACT and  Adult Preventative    Type of outreach:    Sent [employer] message.    Questions for provider review:    None           An Ortez, [facility]  Chart routed to none.

## 2025-07-28 ENCOUNTER — MYC MEDICAL ADVICE (OUTPATIENT)
Dept: OBGYN | Facility: CLINIC | Age: 25
End: 2025-07-28
Payer: COMMERCIAL

## 2025-07-28 DIAGNOSIS — Z97.5 BREAKTHROUGH BLEEDING ON NEXPLANON: ICD-10-CM

## 2025-07-28 DIAGNOSIS — N92.1 BREAKTHROUGH BLEEDING ON NEXPLANON: ICD-10-CM

## 2025-07-28 RX ORDER — NORGESTIMATE AND ETHINYL ESTRADIOL 0.25-0.035
1 KIT ORAL DAILY
Qty: 84 TABLET | Refills: 0 | Status: SHIPPED | OUTPATIENT
Start: 2025-07-28 | End: 2025-07-30

## 2025-07-28 NOTE — TELEPHONE ENCOUNTER
Requested Prescriptions   Pending Prescriptions Disp Refills    norgestimate-ethinyl estradiol (ORTHO-CYCLEN) 0.25-35 MG-MCG tablet 84 tablet 3     Sig: Take 1 tablet by mouth daily.       Contraceptives Protocol Failed - 7/28/2025 12:19 PM        Failed - Recent (12 month) or future (90 days) visit with authorizing provider's specialty (provided they have been seen in the past 15 months)     The patient must have completed an in-person or virtual visit within the past 12 months or has a future visit scheduled within the next 90 days with the authorizing provider s specialty.  Urgent care and e-visits do not qualify as an office visit for this protocol.          Passed - Patient is not a current smoker if age is 35 or older        Passed - Medication is active on med list and the sig matches. RN to manually verify dose and sig if red X/fail.     If the protocol passes (green check), you do not need to verify med dose and sig.    A prescription matches if they are the same clinical intention.    For Example: once daily and every morning are the same.    The protocol can not identify upper and lower case letters as matching and will fail.     For Example: Take 1 tablet (50 mg) by mouth daily     TAKE 1 TABLET (50 MG) BY MOUTH DAILY    For all fails (red x), verify dose and sig.    If the refill does match what is on file, the RN can still proceed to approve the refill request.       If they do not match, route to the appropriate provider.             Passed - Medication indicated for associated diagnosis     Medication is associated with one or more of the following diagnoses:  Contraception  Acne  Dysmenorrhea  Menorrhagia  Amenorrhea  PCOS  Premenstrual Dysphoric Disorder  Irregular menses  Endometriosis  Contraceptive counseling  Finding of menstrual bleeding  Education about oral contraception  Uses contraception  Initial prescription of oral contraception  Oral contraception-no problem  Oral contraceptive  repeat          Passed - No active pregnancy on record        Passed - No positive pregnancy test in past 12 months           Last Written Prescription Date:  8/28/24  Last Fill Quantity: 84,  # refills: 3   Last office visit: 5/9/2024 ; last virtual visit: Visit date not found with prescribing provider:  Dr. Teixeira   Future Office Visit:      Medication is being filled for 1 time refill only due to:  Patient needs to be seen because it has been more than one year since last visit.  Patricia Davila RN on 7/28/2025 at 12:20 PM

## 2025-07-30 ENCOUNTER — VIRTUAL VISIT (OUTPATIENT)
Dept: FAMILY MEDICINE | Facility: CLINIC | Age: 25
End: 2025-07-30
Payer: COMMERCIAL

## 2025-07-30 DIAGNOSIS — N92.1 BREAKTHROUGH BLEEDING ON NEXPLANON: ICD-10-CM

## 2025-07-30 DIAGNOSIS — F41.0 PANIC ATTACK: ICD-10-CM

## 2025-07-30 DIAGNOSIS — G43.109 MIGRAINE WITH AURA AND WITHOUT STATUS MIGRAINOSUS, NOT INTRACTABLE: ICD-10-CM

## 2025-07-30 DIAGNOSIS — Z97.5 BREAKTHROUGH BLEEDING ON NEXPLANON: ICD-10-CM

## 2025-07-30 DIAGNOSIS — Z11.3 SCREENING EXAMINATION FOR STI: Primary | ICD-10-CM

## 2025-07-30 DIAGNOSIS — J45.30 MILD PERSISTENT ASTHMA WITHOUT COMPLICATION: ICD-10-CM

## 2025-07-30 PROCEDURE — 98006 SYNCH AUDIO-VIDEO EST MOD 30: CPT

## 2025-07-30 RX ORDER — ALBUTEROL SULFATE 90 UG/1
INHALANT RESPIRATORY (INHALATION)
Qty: 18 G | Refills: 0 | Status: SHIPPED | OUTPATIENT
Start: 2025-07-30

## 2025-07-30 RX ORDER — NORGESTIMATE AND ETHINYL ESTRADIOL 0.25-0.035
1 KIT ORAL DAILY
Qty: 84 TABLET | Refills: 1 | Status: SHIPPED | OUTPATIENT
Start: 2025-07-30

## 2025-07-30 RX ORDER — SUMATRIPTAN 50 MG/1
50 TABLET, FILM COATED ORAL
Qty: 18 TABLET | Refills: 0 | Status: SHIPPED | OUTPATIENT
Start: 2025-07-30

## 2025-07-30 RX ORDER — HYDROXYZINE HYDROCHLORIDE 25 MG/1
25 TABLET, FILM COATED ORAL 3 TIMES DAILY PRN
Qty: 90 TABLET | Refills: 0 | Status: SHIPPED | OUTPATIENT
Start: 2025-07-30

## 2025-07-30 NOTE — PROGRESS NOTES
"Kashmir is a 25 year old who is being evaluated via a billable video visit.    How would you like to obtain your AVS? Chucky  If the video visit is dropped, the invitation should be resent by: Text to cell phone: [phone]  Will anyone else be joining your video visit? No  {If patient encounters technical issues they should call [phone] :515861}    {PROVIDER CHARTING PREFERENCE:761412}    Subjective   Kashmir is a 25 year old, presenting for the following health issues:  Refill Request        7/30/2025    11:06 AM   Additional Questions   Roomed by chucky     History of Present Illness       Reason for visit:  Birth control renewal, my current doctor is is booked out until October and other OBGYN s also don t have anything in the next 30 days   She is taking medications regularly.        Needs birth control refilled.       BP Readings from Last 6 Encounters:   05/09/24 (!) 148/96   05/06/24 126/63   02/15/24 (!) 145/92   01/15/24 123/86   02/14/22 134/84   07/22/20 130/86          {SUPERLIST (Optional):409036}  {additonal problems for provider to add (Optional):488768}    {ROS Picklists (Optional):055681}      Objective           Vitals:  No vitals were obtained today due to virtual visit.    Physical Exam   {video visit exam brief selected:571040}    {Diagnostic Test Results (Optional):746564}      Video-Visit Details    Type of service:  Video Visit   Originating Location (pt. Location): {video visit patient location:954508::\"Home\"}  {PROVIDER LOCATION On-site should be selected for visits conducted from your clinic location or adjoining [facility] hospital, academic office, or other nearby [facility] building. Off-site should be selected for all other provider locations, including home:245871}  Distant Location (provider location):  {virtual location provider:669329}  Platform used for Video Visit: {Virtual Visit Platforms:926563::\"[email or web address]\"}  Signed Electronically by: ANTONIETA Issa CNP  {Email feedback " regarding this note to [email or web address]   :530790}

## 2025-08-06 ENCOUNTER — MYC MEDICAL ADVICE (OUTPATIENT)
Dept: FAMILY MEDICINE | Facility: CLINIC | Age: 25
End: 2025-08-06
Payer: COMMERCIAL

## 2025-08-06 DIAGNOSIS — G43.109 MIGRAINE WITH AURA AND WITHOUT STATUS MIGRAINOSUS, NOT INTRACTABLE: ICD-10-CM

## 2025-08-06 DIAGNOSIS — F41.0 PANIC ATTACK: ICD-10-CM

## 2025-08-06 RX ORDER — HYDROXYZINE HYDROCHLORIDE 25 MG/1
25 TABLET, FILM COATED ORAL 3 TIMES DAILY PRN
Qty: 90 TABLET | Refills: 0 | OUTPATIENT
Start: 2025-08-06

## 2025-08-06 RX ORDER — SUMATRIPTAN 50 MG/1
50 TABLET, FILM COATED ORAL
Qty: 18 TABLET | Refills: 0 | OUTPATIENT
Start: 2025-08-06